# Patient Record
Sex: FEMALE | Race: WHITE | Employment: OTHER | URBAN - METROPOLITAN AREA
[De-identification: names, ages, dates, MRNs, and addresses within clinical notes are randomized per-mention and may not be internally consistent; named-entity substitution may affect disease eponyms.]

---

## 2017-01-13 ENCOUNTER — APPOINTMENT (OUTPATIENT)
Dept: LAB | Age: 77
End: 2017-01-13
Attending: INTERNAL MEDICINE

## 2017-01-13 DIAGNOSIS — Z98.890 STATUS POST ENDOSCOPY: ICD-10-CM

## 2017-01-13 DIAGNOSIS — K21.9 GASTROESOPHAGEAL REFLUX DISEASE, ESOPHAGITIS PRESENCE NOT SPECIFIED: ICD-10-CM

## 2017-01-13 DIAGNOSIS — K22.70 BARRETT'S ESOPHAGUS WITHOUT DYSPLASIA: ICD-10-CM

## 2017-01-13 PROCEDURE — 88305 TISSUE EXAM BY PATHOLOGIST: CPT

## 2017-01-17 NOTE — PROGRESS NOTES
Quick Note:    1/17/2017  Mary Alice Fowler  [facility]   [address] [place] [address]    Dear Claudy Brown,       Here are the biopsy/pathology findings from your recent EGD (Upper Endoscopy):    esophagitis - an inflammation of the esophagus lining  Biopsies from your

## 2017-04-14 PROCEDURE — 82746 ASSAY OF FOLIC ACID SERUM: CPT | Performed by: INTERNAL MEDICINE

## 2017-04-14 PROCEDURE — 83970 ASSAY OF PARATHORMONE: CPT | Performed by: INTERNAL MEDICINE

## 2017-04-14 PROCEDURE — 82607 VITAMIN B-12: CPT | Performed by: INTERNAL MEDICINE

## 2018-03-15 PROCEDURE — 83970 ASSAY OF PARATHORMONE: CPT | Performed by: INTERNAL MEDICINE

## 2018-05-16 PROBLEM — M81.0 AGE-RELATED OSTEOPOROSIS WITHOUT CURRENT PATHOLOGICAL FRACTURE: Status: ACTIVE | Noted: 2018-05-16

## 2019-06-05 PROBLEM — M25.512 CHRONIC LEFT SHOULDER PAIN: Status: ACTIVE | Noted: 2019-06-05

## 2019-06-05 PROBLEM — G89.29 CHRONIC LEFT SHOULDER PAIN: Status: ACTIVE | Noted: 2019-06-05

## 2020-05-19 PROCEDURE — 88305 TISSUE EXAM BY PATHOLOGIST: CPT | Performed by: SPECIALIST

## 2020-06-07 PROBLEM — Z86.39 HISTORY OF HYPERCALCEMIA: Status: ACTIVE | Noted: 2020-06-07

## 2020-08-19 ENCOUNTER — APPOINTMENT (OUTPATIENT)
Dept: GENERAL RADIOLOGY | Age: 80
End: 2020-08-19
Attending: EMERGENCY MEDICINE

## 2020-08-19 ENCOUNTER — APPOINTMENT (OUTPATIENT)
Dept: CT IMAGING | Age: 80
End: 2020-08-19
Attending: EMERGENCY MEDICINE

## 2020-08-19 ENCOUNTER — HOSPITAL ENCOUNTER (OUTPATIENT)
Age: 80
Setting detail: OBSERVATION
Discharge: HOME | End: 2020-08-20
Attending: EMERGENCY MEDICINE | Admitting: HOSPITALIST

## 2020-08-19 DIAGNOSIS — M54.16 LUMBAR RADICULOPATHY: ICD-10-CM

## 2020-08-19 DIAGNOSIS — N30.00 ACUTE CYSTITIS WITHOUT HEMATURIA: ICD-10-CM

## 2020-08-19 DIAGNOSIS — E86.0 DEHYDRATION: ICD-10-CM

## 2020-08-19 DIAGNOSIS — G45.9 TIA (TRANSIENT ISCHEMIC ATTACK): Primary | ICD-10-CM

## 2020-08-19 LAB
ALBUMIN SERPL-MCNC: 3.7 G/DL (ref 3.6–5.1)
ALP SERPL-CCNC: 41 UNITS/L (ref 45–117)
ALT SERPL-CCNC: 19 UNITS/L
ANION GAP SERPL CALC-SCNC: 9 MMOL/L (ref 10–20)
APPEARANCE UR: ABNORMAL
APTT PPP: 23 SEC (ref 22–32)
AST SERPL-CCNC: 14 UNITS/L
BACTERIA #/AREA URNS HPF: ABNORMAL /HPF
BASOPHILS # BLD: 0 K/MCL (ref 0–0.3)
BASOPHILS NFR BLD: 0 %
BILIRUB CONJ SERPL-MCNC: <0.1 MG/DL (ref 0–0.2)
BILIRUB SERPL-MCNC: 0.2 MG/DL (ref 0.2–1)
BILIRUB UR QL STRIP: NEGATIVE
BUN SERPL-MCNC: 62 MG/DL (ref 6–20)
BUN/CREAT SERPL: 39 (ref 7–25)
CALCIUM SERPL-MCNC: 9.8 MG/DL (ref 8.4–10.2)
CHLORIDE SERPL-SCNC: 112 MMOL/L (ref 98–107)
CO2 SERPL-SCNC: 23 MMOL/L (ref 21–32)
COLOR UR: ABNORMAL
CREAT SERPL-MCNC: 1.59 MG/DL (ref 0.51–0.95)
DIFFERENTIAL METHOD BLD: ABNORMAL
EOSINOPHIL # BLD: 0.2 K/MCL (ref 0.1–0.5)
EOSINOPHIL NFR BLD: 1 %
ERYTHROCYTE [DISTWIDTH] IN BLOOD: 13.4 % (ref 11–15)
GLUCOSE SERPL-MCNC: 186 MG/DL (ref 65–99)
GLUCOSE UR STRIP-MCNC: NEGATIVE MG/DL
HCT VFR BLD CALC: 36 % (ref 36–46.5)
HGB BLD-MCNC: 11.3 G/DL (ref 12–15.5)
HGB UR QL STRIP: NEGATIVE
HYALINE CASTS #/AREA URNS LPF: ABNORMAL /LPF (ref 0–5)
INR PPP: 1
KETONES UR STRIP-MCNC: ABNORMAL MG/DL
LEUKOCYTE ESTERASE UR QL STRIP: ABNORMAL
LYMPHOCYTES # BLD: 3.8 K/MCL (ref 1–4)
LYMPHOCYTES NFR BLD: 27 %
MAGNESIUM SERPL-MCNC: 2.2 MG/DL (ref 1.7–2.4)
MCH RBC QN AUTO: 30 PG (ref 26–34)
MCHC RBC AUTO-ENTMCNC: 31.4 G/DL (ref 32–36.5)
MCV RBC AUTO: 95.5 FL (ref 78–100)
MONOCYTES # BLD: 0.7 K/MCL (ref 0.3–0.9)
MONOCYTES NFR BLD: 5 %
MUCOUS THREADS URNS QL MICRO: PRESENT
NEUTROPHILS # BLD: 9.5 K/MCL (ref 1.8–7.7)
NEUTROPHILS NFR BLD: 67 %
NITRITE UR QL STRIP: NEGATIVE
NRBC BLD MANUAL-RTO: 0 /100 WBC
PH UR STRIP: 5 UNITS (ref 5–7)
PLATELET # BLD: 273 K/MCL (ref 140–450)
POTASSIUM SERPL-SCNC: 4.2 MMOL/L (ref 3.4–5.1)
PROT SERPL-MCNC: 7.3 G/DL (ref 6.4–8.2)
PROT UR STRIP-MCNC: 30 MG/DL
PROTHROMBIN TIME: 10.2 SEC (ref 9.7–11.8)
RBC # BLD: 3.77 MIL/MCL (ref 4–5.2)
RBC #/AREA URNS HPF: ABNORMAL /HPF (ref 0–2)
SODIUM SERPL-SCNC: 140 MMOL/L (ref 135–145)
SP GR UR STRIP: 1.03 (ref 1–1.03)
SPECIMEN SOURCE: ABNORMAL
SQUAMOUS #/AREA URNS HPF: ABNORMAL /HPF (ref 0–5)
TROPONIN I SERPL HS-MCNC: <0.02 NG/ML
UROBILINOGEN UR STRIP-MCNC: 2 MG/DL (ref 0–1)
WBC # BLD: 14.3 K/MCL (ref 4.2–11)
WBC #/AREA URNS HPF: ABNORMAL /HPF (ref 0–5)

## 2020-08-19 PROCEDURE — 96361 HYDRATE IV INFUSION ADD-ON: CPT

## 2020-08-19 PROCEDURE — 84484 ASSAY OF TROPONIN QUANT: CPT

## 2020-08-19 PROCEDURE — 93005 ELECTROCARDIOGRAM TRACING: CPT | Performed by: EMERGENCY MEDICINE

## 2020-08-19 PROCEDURE — 83735 ASSAY OF MAGNESIUM: CPT

## 2020-08-19 PROCEDURE — 96374 THER/PROPH/DIAG INJ IV PUSH: CPT

## 2020-08-19 PROCEDURE — 85730 THROMBOPLASTIN TIME PARTIAL: CPT

## 2020-08-19 PROCEDURE — 99285 EMERGENCY DEPT VISIT HI MDM: CPT

## 2020-08-19 PROCEDURE — 72131 CT LUMBAR SPINE W/O DYE: CPT

## 2020-08-19 PROCEDURE — 87086 URINE CULTURE/COLONY COUNT: CPT

## 2020-08-19 PROCEDURE — 10002807 HB RX 258: Performed by: EMERGENCY MEDICINE

## 2020-08-19 PROCEDURE — 71045 X-RAY EXAM CHEST 1 VIEW: CPT

## 2020-08-19 PROCEDURE — 10002800 HB RX 250 W HCPCS: Performed by: EMERGENCY MEDICINE

## 2020-08-19 PROCEDURE — 10004651 HB RX, NO CHARGE ITEM: Performed by: EMERGENCY MEDICINE

## 2020-08-19 PROCEDURE — 81001 URINALYSIS AUTO W/SCOPE: CPT

## 2020-08-19 PROCEDURE — 85025 COMPLETE CBC W/AUTO DIFF WBC: CPT

## 2020-08-19 PROCEDURE — 80076 HEPATIC FUNCTION PANEL: CPT

## 2020-08-19 PROCEDURE — 80048 BASIC METABOLIC PNL TOTAL CA: CPT

## 2020-08-19 PROCEDURE — 85610 PROTHROMBIN TIME: CPT

## 2020-08-19 PROCEDURE — 70450 CT HEAD/BRAIN W/O DYE: CPT

## 2020-08-19 RX ORDER — SIMVASTATIN 40 MG
40 TABLET ORAL NIGHTLY
COMMUNITY
Start: 2020-04-27

## 2020-08-19 RX ORDER — ASPIRIN 81 MG/1
324 TABLET, CHEWABLE ORAL ONCE
Status: COMPLETED | OUTPATIENT
Start: 2020-08-19 | End: 2020-08-19

## 2020-08-19 RX ORDER — LEVOTHYROXINE SODIUM 0.07 MG/1
75 TABLET ORAL DAILY
COMMUNITY
Start: 2020-04-27

## 2020-08-19 RX ORDER — OMEPRAZOLE 40 MG/1
40 CAPSULE, DELAYED RELEASE ORAL DAILY
Status: ON HOLD | COMMUNITY
Start: 2020-04-27 | End: 2020-08-20 | Stop reason: HOSPADM

## 2020-08-19 RX ORDER — CEFAZOLIN SODIUM/WATER 2 G/20 ML
2000 SYRINGE (ML) INTRAVENOUS ONCE
Status: COMPLETED | OUTPATIENT
Start: 2020-08-19 | End: 2020-08-19

## 2020-08-19 RX ORDER — LOSARTAN POTASSIUM 100 MG/1
100 TABLET ORAL DAILY
Status: ON HOLD | COMMUNITY
Start: 2020-08-15 | End: 2020-08-20 | Stop reason: HOSPADM

## 2020-08-19 RX ADMIN — ASPIRIN 81 MG 324 MG: 81 TABLET ORAL at 23:20

## 2020-08-19 RX ADMIN — SODIUM CHLORIDE 1000 ML: 0.9 INJECTION, SOLUTION INTRAVENOUS at 23:20

## 2020-08-19 RX ADMIN — CEFTRIAXONE SODIUM 2000 MG: 100 INJECTION, POWDER, FOR SOLUTION INTRAVENOUS at 23:20

## 2020-08-19 SDOH — HEALTH STABILITY: MENTAL HEALTH: HOW OFTEN DO YOU HAVE A DRINK CONTAINING ALCOHOL?: NEVER

## 2020-08-20 ENCOUNTER — APPOINTMENT (OUTPATIENT)
Dept: CARDIOLOGY | Age: 80
End: 2020-08-20
Attending: PSYCHIATRY & NEUROLOGY

## 2020-08-20 ENCOUNTER — APPOINTMENT (OUTPATIENT)
Dept: CARDIOLOGY | Age: 80
End: 2020-08-20
Attending: HOSPITALIST

## 2020-08-20 ENCOUNTER — APPOINTMENT (OUTPATIENT)
Dept: MRI IMAGING | Age: 80
End: 2020-08-20
Attending: PSYCHIATRY & NEUROLOGY

## 2020-08-20 ENCOUNTER — APPOINTMENT (OUTPATIENT)
Dept: ULTRASOUND IMAGING | Age: 80
End: 2020-08-20
Attending: PSYCHIATRY & NEUROLOGY

## 2020-08-20 VITALS
HEART RATE: 75 BPM | HEIGHT: 67 IN | SYSTOLIC BLOOD PRESSURE: 118 MMHG | TEMPERATURE: 98.6 F | RESPIRATION RATE: 14 BRPM | BODY MASS INDEX: 19.48 KG/M2 | OXYGEN SATURATION: 100 % | DIASTOLIC BLOOD PRESSURE: 62 MMHG | WEIGHT: 124.12 LBS

## 2020-08-20 LAB
ANION GAP SERPL CALC-SCNC: 10 MMOL/L (ref 10–20)
ATRIAL RATE (BPM): 69
ATRIAL RATE (BPM): 78
BASOPHILS # BLD: 0 K/MCL (ref 0–0.3)
BASOPHILS NFR BLD: 0 %
BUN SERPL-MCNC: 54 MG/DL (ref 6–20)
BUN/CREAT SERPL: 47 (ref 7–25)
CALCIUM SERPL-MCNC: 9.1 MG/DL (ref 8.4–10.2)
CHLORIDE SERPL-SCNC: 116 MMOL/L (ref 98–107)
CHOLEST SERPL-MCNC: 132 MG/DL
CHOLEST/HDLC SERPL: 3.2 {RATIO}
CO2 SERPL-SCNC: 22 MMOL/L (ref 21–32)
CREAT SERPL-MCNC: 1.15 MG/DL (ref 0.51–0.95)
DIFFERENTIAL METHOD BLD: ABNORMAL
EOSINOPHIL # BLD: 0.2 K/MCL (ref 0.1–0.5)
EOSINOPHIL NFR BLD: 2 %
ERYTHROCYTE [DISTWIDTH] IN BLOOD: 13.4 % (ref 11–15)
GLUCOSE SERPL-MCNC: 93 MG/DL (ref 65–99)
HCT VFR BLD CALC: 29.2 % (ref 36–46.5)
HDLC SERPL-MCNC: 41 MG/DL
HGB BLD-MCNC: 9.2 G/DL (ref 12–15.5)
IMM GRANULOCYTES # BLD AUTO: 0.1 K/MCL (ref 0–0.2)
IMM GRANULOCYTES NFR BLD: 1 %
LDLC SERPL CALC-MCNC: 67 MG/DL
LYMPHOCYTES # BLD: 2.7 K/MCL (ref 1–4)
LYMPHOCYTES NFR BLD: 27 %
MCH RBC QN AUTO: 29.8 PG (ref 26–34)
MCHC RBC AUTO-ENTMCNC: 31.5 G/DL (ref 32–36.5)
MCV RBC AUTO: 94.5 FL (ref 78–100)
MONOCYTES # BLD: 0.6 K/MCL (ref 0.3–0.9)
MONOCYTES NFR BLD: 6 %
NEUTROPHILS # BLD: 6.6 K/MCL (ref 1.8–7.7)
NEUTROPHILS NFR BLD: 64 %
NONHDLC SERPL-MCNC: 91 MG/DL
NRBC BLD MANUAL-RTO: 0 /100 WBC
P AXIS (DEGREES): 63
P AXIS (DEGREES): 69
PLATELET # BLD: 182 K/MCL (ref 140–450)
POTASSIUM SERPL-SCNC: 4.2 MMOL/L (ref 3.4–5.1)
PR-INTERVAL (MSEC): 164
PR-INTERVAL (MSEC): 164
QRS-INTERVAL (MSEC): 60
QRS-INTERVAL (MSEC): 70
QT-INTERVAL (MSEC): 358
QT-INTERVAL (MSEC): 378
QTC: 405
QTC: 408
R AXIS (DEGREES): 47
R AXIS (DEGREES): 62
RBC # BLD: 3.09 MIL/MCL (ref 4–5.2)
REPORT TEXT: NORMAL
REPORT TEXT: NORMAL
SODIUM SERPL-SCNC: 144 MMOL/L (ref 135–145)
T AXIS (DEGREES): 49
T AXIS (DEGREES): 66
TRIGL SERPL-MCNC: 120 MG/DL
VENTRICULAR RATE EKG/MIN (BPM): 69
VENTRICULAR RATE EKG/MIN (BPM): 78
WBC # BLD: 10.2 K/MCL (ref 4.2–11)

## 2020-08-20 PROCEDURE — 10002803 HB RX 637: Performed by: PSYCHIATRY & NEUROLOGY

## 2020-08-20 PROCEDURE — 93005 ELECTROCARDIOGRAM TRACING: CPT | Performed by: HOSPITALIST

## 2020-08-20 PROCEDURE — 10002800 HB RX 250 W HCPCS: Performed by: HOSPITALIST

## 2020-08-20 PROCEDURE — 10002803 HB RX 637: Performed by: HOSPITALIST

## 2020-08-20 PROCEDURE — 70551 MRI BRAIN STEM W/O DYE: CPT

## 2020-08-20 PROCEDURE — 80048 BASIC METABOLIC PNL TOTAL CA: CPT

## 2020-08-20 PROCEDURE — 36415 COLL VENOUS BLD VENIPUNCTURE: CPT

## 2020-08-20 PROCEDURE — G0378 HOSPITAL OBSERVATION PER HR: HCPCS

## 2020-08-20 PROCEDURE — 93880 EXTRACRANIAL BILAT STUDY: CPT

## 2020-08-20 PROCEDURE — 96372 THER/PROPH/DIAG INJ SC/IM: CPT

## 2020-08-20 PROCEDURE — 96361 HYDRATE IV INFUSION ADD-ON: CPT

## 2020-08-20 PROCEDURE — 93270 REMOTE 30 DAY ECG REV/REPORT: CPT

## 2020-08-20 PROCEDURE — 10002807 HB RX 258: Performed by: HOSPITALIST

## 2020-08-20 PROCEDURE — 10004651 HB RX, NO CHARGE ITEM: Performed by: HOSPITALIST

## 2020-08-20 PROCEDURE — 80061 LIPID PANEL: CPT

## 2020-08-20 PROCEDURE — 85025 COMPLETE CBC W/AUTO DIFF WBC: CPT

## 2020-08-20 PROCEDURE — 93306 TTE W/DOPPLER COMPLETE: CPT

## 2020-08-20 RX ORDER — CELECOXIB 200 MG/1
200 CAPSULE ORAL DAILY
COMMUNITY

## 2020-08-20 RX ORDER — SODIUM CHLORIDE 9 MG/ML
INJECTION, SOLUTION INTRAVENOUS CONTINUOUS
Status: DISCONTINUED | OUTPATIENT
Start: 2020-08-20 | End: 2020-08-20

## 2020-08-20 RX ORDER — 0.9 % SODIUM CHLORIDE 0.9 %
2 VIAL (ML) INJECTION EVERY 12 HOURS SCHEDULED
Status: DISCONTINUED | OUTPATIENT
Start: 2020-08-20 | End: 2020-08-20 | Stop reason: HOSPADM

## 2020-08-20 RX ORDER — CEFAZOLIN SODIUM/WATER 1 G/10 ML
1000 SYRINGE (ML) INTRAVENOUS DAILY
Status: DISCONTINUED | OUTPATIENT
Start: 2020-08-20 | End: 2020-08-20 | Stop reason: HOSPADM

## 2020-08-20 RX ORDER — RALOXIFENE HYDROCHLORIDE 60 MG/1
60 TABLET, FILM COATED ORAL DAILY
Status: DISCONTINUED | OUTPATIENT
Start: 2020-08-20 | End: 2020-08-20 | Stop reason: HOSPADM

## 2020-08-20 RX ORDER — PANTOPRAZOLE SODIUM 40 MG/1
40 TABLET, DELAYED RELEASE ORAL NIGHTLY
Status: DISCONTINUED | OUTPATIENT
Start: 2020-08-20 | End: 2020-08-20 | Stop reason: HOSPADM

## 2020-08-20 RX ORDER — ENOXAPARIN SODIUM 100 MG/ML
30 INJECTION SUBCUTANEOUS DAILY
Status: DISCONTINUED | OUTPATIENT
Start: 2020-08-20 | End: 2020-08-20 | Stop reason: HOSPADM

## 2020-08-20 RX ORDER — PANTOPRAZOLE SODIUM 20 MG/1
20 TABLET, DELAYED RELEASE ORAL DAILY
Qty: 90 TABLET | Refills: 0 | Status: SHIPPED | OUTPATIENT
Start: 2020-08-20

## 2020-08-20 RX ORDER — CLOPIDOGREL BISULFATE 75 MG/1
75 TABLET ORAL DAILY
Status: DISCONTINUED | OUTPATIENT
Start: 2020-08-21 | End: 2020-08-20 | Stop reason: HOSPADM

## 2020-08-20 RX ORDER — RALOXIFENE HYDROCHLORIDE 60 MG/1
60 TABLET, FILM COATED ORAL DAILY
COMMUNITY

## 2020-08-20 RX ORDER — CLOPIDOGREL BISULFATE 75 MG/1
300 TABLET ORAL ONCE
Status: COMPLETED | OUTPATIENT
Start: 2020-08-20 | End: 2020-08-20

## 2020-08-20 RX ORDER — MECLIZINE HYDROCHLORIDE 25 MG/1
25 TABLET ORAL 3 TIMES DAILY PRN
COMMUNITY

## 2020-08-20 RX ORDER — CLOPIDOGREL BISULFATE 75 MG/1
75 TABLET ORAL DAILY
Qty: 90 TABLET | Refills: 3 | Status: SHIPPED | OUTPATIENT
Start: 2020-08-21

## 2020-08-20 RX ORDER — ATORVASTATIN CALCIUM 20 MG/1
20 TABLET, FILM COATED ORAL NIGHTLY
Status: DISCONTINUED | OUTPATIENT
Start: 2020-08-20 | End: 2020-08-20 | Stop reason: HOSPADM

## 2020-08-20 RX ORDER — CEPHALEXIN 500 MG/1
500 CAPSULE ORAL 2 TIMES DAILY
Qty: 8 CAPSULE | Refills: 0 | Status: SHIPPED | OUTPATIENT
Start: 2020-08-20 | End: 2020-08-24

## 2020-08-20 RX ORDER — ASPIRIN 81 MG/1
81 TABLET ORAL DAILY
Qty: 21 TABLET | Refills: 0 | Status: SHIPPED | COMMUNITY
Start: 2020-08-20 | End: 2020-09-10

## 2020-08-20 RX ORDER — LEVOTHYROXINE SODIUM 0.05 MG/1
75 TABLET ORAL
Status: DISCONTINUED | OUTPATIENT
Start: 2020-08-20 | End: 2020-08-20 | Stop reason: HOSPADM

## 2020-08-20 RX ORDER — CELECOXIB 100 MG/1
100 CAPSULE ORAL 2 TIMES DAILY
Status: ON HOLD | COMMUNITY
End: 2020-08-20

## 2020-08-20 RX ADMIN — LEVOTHYROXINE SODIUM 75 MCG: 50 TABLET ORAL at 13:03

## 2020-08-20 RX ADMIN — ENOXAPARIN SODIUM 30 MG: 30 INJECTION SUBCUTANEOUS at 08:48

## 2020-08-20 RX ADMIN — CLOPIDOGREL BISULFATE 300 MG: 75 TABLET, FILM COATED ORAL at 15:46

## 2020-08-20 RX ADMIN — SODIUM CHLORIDE: 0.9 INJECTION, SOLUTION INTRAVENOUS at 02:31

## 2020-08-20 RX ADMIN — RALOXIFENE HYDROCHLORIDE 60 MG: 60 TABLET, FILM COATED ORAL at 13:03

## 2020-08-20 RX ADMIN — SODIUM CHLORIDE 2 ML: 9 INJECTION, SOLUTION INTRAMUSCULAR; INTRAVENOUS; SUBCUTANEOUS at 08:53

## 2020-08-20 SDOH — HEALTH STABILITY: MENTAL HEALTH: HOW OFTEN DO YOU HAVE A DRINK CONTAINING ALCOHOL?: NEVER

## 2020-08-20 ASSESSMENT — PAIN SCALES - GENERAL
PAINLEVEL_OUTOF10: 0

## 2020-08-20 ASSESSMENT — PATIENT HEALTH QUESTIONNAIRE - PHQ9
2. FEELING DOWN, DEPRESSED OR HOPELESS: MORE THAN HALF THE DAYS
SUM OF ALL RESPONSES TO PHQ9 QUESTIONS 1 AND 2: 2
SUM OF ALL RESPONSES TO PHQ9 QUESTIONS 1 AND 2: 2
CLINICAL INTERPRETATION OF PHQ2 SCORE: NO FURTHER SCREENING NEEDED
IS PATIENT ABLE TO COMPLETE PHQ2 OR PHQ9: YES
CLINICAL INTERPRETATION OF PHQ9 SCORE: NO FURTHER SCREENING NEEDED
1. LITTLE INTEREST OR PLEASURE IN DOING THINGS: NOT AT ALL

## 2020-08-20 ASSESSMENT — ENCOUNTER SYMPTOMS
SINUS PRESSURE: 0
SPEECH DIFFICULTY: 1
RHINORRHEA: 0
NAUSEA: 0
SORE THROAT: 0
ABDOMINAL PAIN: 0
WEAKNESS: 1
DIARRHEA: 0
PHOTOPHOBIA: 0
HEADACHES: 0
HALLUCINATIONS: 0
SHORTNESS OF BREATH: 0
FATIGUE: 0
WHEEZING: 0
ADENOPATHY: 0
VOMITING: 0
NUMBNESS: 0
FEVER: 0
CHILLS: 0

## 2020-08-20 ASSESSMENT — COGNITIVE AND FUNCTIONAL STATUS - GENERAL
BECAUSE OF A PHYSICAL, MENTAL, OR EMOTIONAL CONDITION, DO YOU HAVE DIFFICULTY DOING ERRANDS ALONE: NO
DO YOU HAVE DIFFICULTY DRESSING OR BATHING: NO
BECAUSE OF A PHYSICAL, MENTAL, OR EMOTIONAL CONDITION, DO YOU HAVE SERIOUS DIFFICULTY CONCENTRATING, REMEMBERING OR MAKING DECISIONS: NO
ARE YOU DEAF OR DO YOU HAVE SERIOUS DIFFICULTY  HEARING: NO
DO YOU HAVE SERIOUS DIFFICULTY WALKING OR CLIMBING STAIRS: NO
ARE YOU BLIND OR DO YOU HAVE SERIOUS DIFFICULTY SEEING, EVEN WHEN WEARING GLASSES: NO

## 2020-08-20 ASSESSMENT — LIFESTYLE VARIABLES
AUDIT-C TOTAL SCORE: 0
ALCOHOL_USE_STATUS: NO OR LOW RISK WITH VALIDATED TOOL
HOW OFTEN DO YOU HAVE A DRINK CONTAINING ALCOHOL: NEVER
HOW MANY STANDARD DRINKS CONTAINING ALCOHOL DO YOU HAVE ON A TYPICAL DAY: 0,1 OR 2
HOW OFTEN DO YOU HAVE 6 OR MORE DRINKS ON ONE OCCASION: NEVER

## 2020-08-20 ASSESSMENT — ACTIVITIES OF DAILY LIVING (ADL)
RECENT_DECLINE_ADL: NO
CHRONIC_PAIN_PRESENT: YES, CHRONIC
DESCRIBE HOW PAIN IMPACTS YOUR LIFE: NONE/CAN MANAGE
ADL_BEFORE_ADMISSION: INDEPENDENT
ADL_SCORE: 12
ADL_SHORT_OF_BREATH: NO

## 2020-08-20 ASSESSMENT — COLUMBIA-SUICIDE SEVERITY RATING SCALE - C-SSRS
IS THE PATIENT ABLE TO COMPLETE C-SSRS: YES
6. HAVE YOU EVER DONE ANYTHING, STARTED TO DO ANYTHING, OR PREPARED TO DO ANYTHING TO END YOUR LIFE?: NO
1. WITHIN THE PAST MONTH, HAVE YOU WISHED YOU WERE DEAD OR WISHED YOU COULD GO TO SLEEP AND NOT WAKE UP?: NO
2. HAVE YOU ACTUALLY HAD ANY THOUGHTS OF KILLING YOURSELF?: NO

## 2020-08-21 LAB
BACTERIA UR CULT: NORMAL
REPORT STATUS (RPT): NORMAL
SPECIMEN SOURCE: NORMAL

## 2020-09-04 PROBLEM — R47.01 APHASIA: Status: ACTIVE | Noted: 2020-09-04

## 2020-09-04 PROBLEM — I50.32 CHRONIC DIASTOLIC CHF (CONGESTIVE HEART FAILURE) (HCC): Status: ACTIVE | Noted: 2020-09-04

## 2021-05-04 PROBLEM — I70.0 AORTIC ATHEROSCLEROSIS (HCC): Status: ACTIVE | Noted: 2021-05-04

## 2021-05-04 PROBLEM — Z86.73 H/O: STROKE: Status: ACTIVE | Noted: 2021-05-04

## 2021-11-09 PROBLEM — I11.0 HYPERTENSIVE HEART DISEASE WITH CONGESTIVE HEART FAILURE (HCC): Status: ACTIVE | Noted: 2021-11-09

## (undated) NOTE — Clinical Note
1/17/2017          [address]   [place] [address]    Dear Elly Ham,       Here are the biopsy/pathology findings from your recent EGD (Upper  Endoscopy):    esophagitis - an inflammation of the esophagus lining  Biopsies from your l